# Patient Record
Sex: FEMALE | Race: BLACK OR AFRICAN AMERICAN
[De-identification: names, ages, dates, MRNs, and addresses within clinical notes are randomized per-mention and may not be internally consistent; named-entity substitution may affect disease eponyms.]

---

## 2020-07-22 ENCOUNTER — HOSPITAL ENCOUNTER (EMERGENCY)
Dept: HOSPITAL 61 - ER | Age: 20
Discharge: HOME | End: 2020-07-22
Payer: COMMERCIAL

## 2020-07-22 VITALS — SYSTOLIC BLOOD PRESSURE: 127 MMHG | DIASTOLIC BLOOD PRESSURE: 82 MMHG

## 2020-07-22 VITALS — HEIGHT: 64 IN | WEIGHT: 164.24 LBS | BODY MASS INDEX: 28.04 KG/M2
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
## 2020-07-22 DIAGNOSIS — S90.112A: Primary | ICD-10-CM

## 2020-07-22 PROCEDURE — 81025 URINE PREGNANCY TEST: CPT

## 2020-07-22 PROCEDURE — 73630 X-RAY EXAM OF FOOT: CPT

## 2020-07-22 PROCEDURE — 99283 EMERGENCY DEPT VISIT LOW MDM: CPT

## 2020-07-22 NOTE — PHYS DOC
Past Medical History


Past Medical History:  No Pertinent History


Past Surgical History:  No Surgical History


Smoking Status:  Never Smoker


Alcohol Use:  Occasionally





General Adult


EDM:


Chief Complaint:  TOE PROBLEM





HPI:


HPI:





Patient is a 20  year old female who presents to the emergency department for 

left great toe pain after dropping a pallet on her toe today at work.  She 

denies any numbness, tingling, or decreased sensation of the affected foot.  She

denies any bruising.  Patient rates pain 6 out of 10 with walking and 4 out of 

10 at rest, it is worse with bearing weight and movement, no radiation of pain, 

patient took 200 mg of ibuprofen prior to arrival reports that that did not 

improve her pain.





Review of Systems:


Review of Systems:


Constitutional:   Denies fever or chills. []


Musculoskeletal:   See history of present illness


Integument:   Denies rash see HPI. [] 


Psychiatric:  Denies depression or anxiety. []





Heart Score:


Risk Factors:


Risk Factors:  DM, Current or recent (<one month) smoker, HTN, HLP, family 

history of CAD, obesity.


Risk Scores:


Score 0 - 3:  2.5% MACE over next 6 weeks - Discharge Home


Score 4 - 6:  20.3% MACE over next 6 weeks - Admit for Clinical Observation


Score 7 - 10:  72.7% MACE over next 6 weeks - Early Invasive Strategies





Allergies:


Allergies:





Allergies








Coded Allergies Type Severity Reaction Last Updated Verified


 


  No Known Drug Allergies    7/22/20 No











Physical Exam:


PE:


Constitutional: Well developed, well nourished, no acute distress, non-toxic 

appearance. []


HENT: Normocephalic, atraumatic, bilateral external ears normal, nose normal. []


Eyes: PERRLA, EOMI, conjunctiva normal, no discharge. [] 


Neck: Normal range of motion, no stridor. [] 


Cardiovascular:Heart rate regular rhythm


Lungs & Thorax:  Respirations even and unlabored, no retractions, no respiratory

 distress


Skin: Warm, dry, no erythema, no rash. [] 


Extremities: Right great toe: Limited range of motion due to pain, no swelling 

noted, no obvious deformity, cap refill less than 3 seconds, pedal pulse 2+, 

sensation intact [] 


Neurologic: Alert and oriented X 3, no focal deficits noted. []


Psychologic: Affect normal, judgement normal, mood normal. []





Current Patient Data:


Labs:





                                Laboratory Tests








Test


 7/22/20


18:58


 


POC Urine HCG, Qualitative


 Hcg negative


(Negative)








Vital Signs:





                                   Vital Signs








  Date Time  Temp Pulse Resp B/P (MAP) Pulse Ox O2 Delivery O2 Flow Rate FiO2


 


7/22/20 18:33 98.7 73 16 127/87 (100) 98 Room Air  





 98.7       











EKG:


EKG:


[]





Radiology/Procedures:


Radiology/Procedures:


PROCEDURE: FOOT LEFT 3V





EXAM: 3 views left foot


 


DATE: 7/22/2020 6:43 PM


 


INDICATION: Great toe pain after pallet dropped onto foot


 


COMPARISON: No Prior


 


FINDINGS/


IMPRESSION:


1.  No evidence of acute fracture or dislocation. 


2.  Joint spaces are preserved without significant 


degenerative/proliferative change.


3.  Soft tissue swelling about the great toe[]





Course & Med Decision Making:


Course & Med Decision Making


Pertinent Labs and Imaging studies reviewed. (See chart for details)





[]





Dragon Disclaimer:


Dragon Disclaimer:


This electronic medical record was generated, in whole or in part, using a voice

 recognition dictation system.





Departure


Departure


Impression:  


   Primary Impression:  


   Pain of left great toe


   Additional Impression:  


   Contusion of toe of left foot


   Qualified Codes:  S90.112A - Contusion of left great toe without damage to 

   nail, initial encounter


Disposition:  01 HOME, SELF-CARE


Condition:  STABLE


Referrals:  


NO PCP (PCP)








MARYBEL GALVEZ MD


Patient Instructions:  Contusion, Easy-to-Read





Additional Instructions:  


You can take Tylenol or ibuprofen as needed for pain. Recommend application of 

ice, elevation, and rest of affected extremity.  Wear the postop shoe as needed 

for comfort.  Follow-up with Dr. Galvez if symptoms persist.  Return to the ER 

if your symptoms worsen.





[facility]


[address] [place]  [address]


[phone]





[facility]


[address] [place]  [address]


[phone]





[facility]


[address]


[place]  [address]


[phone]





[facility]


[address]


[place]  [address]


[phone]





[facility]


[address] [place]  [address]


[phone]





[facility]


[address] [place] [address] [place]  [address]


[phone]





[facility]


[address]


[place]  [address]


[phone]





[facility]


[address]


[place]  [address]


[phone]





[facility] 


[address]


[place]  [address]


[phone]





[facility]


[address] [place]  [address]


[phone]





Justicifation of Admission Dx:


Justifications for Admission:


Justification of Admission Dx:  N/A





Splinting


Splinting :  


   Location:  Left foot


   Pre-Made Type:  velcro (Postop shoe)


   Pre-Proc Neuro Vasc Exam:  normal


   Post-Proc Neuro Vasc Exam:  normal, unchanged from pre-exam











AMANDA GRACIA APRN       Jul 22, 2020 19:34

## 2020-07-22 NOTE — RAD
EXAM: 3 views left foot

 

DATE: 7/22/2020 6:43 PM

 

INDICATION: Great toe pain after pallet dropped onto foot

 

COMPARISON: No Prior

 

FINDINGS/

IMPRESSION:

1.  No evidence of acute fracture or dislocation. 

2.  Joint spaces are preserved without significant 

degenerative/proliferative change.

3.  Soft tissue swelling about the great toe

 

Electronically signed by: Jules Adair MD (7/22/2020 7:23 PM) [facility]